# Patient Record
Sex: MALE | Race: WHITE | NOT HISPANIC OR LATINO | Employment: OTHER | URBAN - METROPOLITAN AREA
[De-identification: names, ages, dates, MRNs, and addresses within clinical notes are randomized per-mention and may not be internally consistent; named-entity substitution may affect disease eponyms.]

---

## 2019-10-02 ENCOUNTER — APPOINTMENT (OUTPATIENT)
Dept: LAB | Facility: CLINIC | Age: 24
End: 2019-10-02
Payer: COMMERCIAL

## 2019-10-02 ENCOUNTER — TRANSCRIBE ORDERS (OUTPATIENT)
Dept: LAB | Facility: CLINIC | Age: 24
End: 2019-10-02

## 2019-10-02 DIAGNOSIS — F39 MILD MOOD DISORDER (HCC): Primary | ICD-10-CM

## 2019-10-02 DIAGNOSIS — F39 MILD MOOD DISORDER (HCC): ICD-10-CM

## 2019-10-02 LAB
ALBUMIN SERPL BCP-MCNC: 4.4 G/DL (ref 3.5–5)
ALP SERPL-CCNC: 48 U/L (ref 46–116)
ALT SERPL W P-5'-P-CCNC: 24 U/L (ref 12–78)
ANION GAP SERPL CALCULATED.3IONS-SCNC: 5 MMOL/L (ref 4–13)
AST SERPL W P-5'-P-CCNC: 18 U/L (ref 5–45)
BASOPHILS # BLD AUTO: 0.04 THOUSANDS/ΜL (ref 0–0.1)
BASOPHILS NFR BLD AUTO: 1 % (ref 0–1)
BILIRUB SERPL-MCNC: 0.53 MG/DL (ref 0.2–1)
BUN SERPL-MCNC: 17 MG/DL (ref 5–25)
CALCIUM SERPL-MCNC: 9.3 MG/DL (ref 8.3–10.1)
CHLORIDE SERPL-SCNC: 105 MMOL/L (ref 100–108)
CHOLEST SERPL-MCNC: 101 MG/DL (ref 50–200)
CO2 SERPL-SCNC: 29 MMOL/L (ref 21–32)
CREAT SERPL-MCNC: 0.94 MG/DL (ref 0.6–1.3)
EOSINOPHIL # BLD AUTO: 0.07 THOUSAND/ΜL (ref 0–0.61)
EOSINOPHIL NFR BLD AUTO: 2 % (ref 0–6)
ERYTHROCYTE [DISTWIDTH] IN BLOOD BY AUTOMATED COUNT: 12.7 % (ref 11.6–15.1)
GFR SERPL CREATININE-BSD FRML MDRD: 115 ML/MIN/1.73SQ M
GLUCOSE P FAST SERPL-MCNC: 78 MG/DL (ref 65–99)
HCT VFR BLD AUTO: 47.8 % (ref 36.5–49.3)
HDLC SERPL-MCNC: 36 MG/DL (ref 40–60)
HGB BLD-MCNC: 15.8 G/DL (ref 12–17)
IMM GRANULOCYTES # BLD AUTO: 0.01 THOUSAND/UL (ref 0–0.2)
IMM GRANULOCYTES NFR BLD AUTO: 0 % (ref 0–2)
LDLC SERPL CALC-MCNC: 55 MG/DL (ref 0–100)
LYMPHOCYTES # BLD AUTO: 1.81 THOUSANDS/ΜL (ref 0.6–4.47)
LYMPHOCYTES NFR BLD AUTO: 41 % (ref 14–44)
MCH RBC QN AUTO: 31 PG (ref 26.8–34.3)
MCHC RBC AUTO-ENTMCNC: 33.1 G/DL (ref 31.4–37.4)
MCV RBC AUTO: 94 FL (ref 82–98)
MONOCYTES # BLD AUTO: 0.45 THOUSAND/ΜL (ref 0.17–1.22)
MONOCYTES NFR BLD AUTO: 10 % (ref 4–12)
NEUTROPHILS # BLD AUTO: 2.01 THOUSANDS/ΜL (ref 1.85–7.62)
NEUTS SEG NFR BLD AUTO: 46 % (ref 43–75)
NONHDLC SERPL-MCNC: 65 MG/DL
NRBC BLD AUTO-RTO: 0 /100 WBCS
PLATELET # BLD AUTO: 243 THOUSANDS/UL (ref 149–390)
PMV BLD AUTO: 11.1 FL (ref 8.9–12.7)
POTASSIUM SERPL-SCNC: 4 MMOL/L (ref 3.5–5.3)
PROT SERPL-MCNC: 8.1 G/DL (ref 6.4–8.2)
RBC # BLD AUTO: 5.1 MILLION/UL (ref 3.88–5.62)
SODIUM SERPL-SCNC: 139 MMOL/L (ref 136–145)
TRIGL SERPL-MCNC: 49 MG/DL
VALPROATE SERPL-MCNC: 70 UG/ML (ref 50–100)
WBC # BLD AUTO: 4.39 THOUSAND/UL (ref 4.31–10.16)

## 2019-10-02 PROCEDURE — 85025 COMPLETE CBC W/AUTO DIFF WBC: CPT

## 2019-10-02 PROCEDURE — 36415 COLL VENOUS BLD VENIPUNCTURE: CPT

## 2019-10-02 PROCEDURE — 80164 ASSAY DIPROPYLACETIC ACD TOT: CPT

## 2019-10-02 PROCEDURE — 80053 COMPREHEN METABOLIC PANEL: CPT

## 2019-10-02 PROCEDURE — 80061 LIPID PANEL: CPT

## 2019-11-21 ENCOUNTER — TRANSCRIBE ORDERS (OUTPATIENT)
Dept: LAB | Facility: CLINIC | Age: 24
End: 2019-11-21

## 2019-11-21 DIAGNOSIS — Z13.6 SCREENING FOR CARDIOVASCULAR CONDITION: Primary | ICD-10-CM

## 2019-11-21 DIAGNOSIS — Z00.00 ENCOUNTER FOR ANNUAL HEALTH EXAMINATION: ICD-10-CM

## 2019-12-18 ENCOUNTER — HOSPITAL ENCOUNTER (EMERGENCY)
Facility: HOSPITAL | Age: 24
Discharge: HOME | End: 2019-12-18
Attending: EMERGENCY MEDICINE
Payer: COMMERCIAL

## 2019-12-18 ENCOUNTER — APPOINTMENT (EMERGENCY)
Dept: RADIOLOGY | Facility: HOSPITAL | Age: 24
End: 2019-12-18
Payer: COMMERCIAL

## 2019-12-18 VITALS
SYSTOLIC BLOOD PRESSURE: 158 MMHG | DIASTOLIC BLOOD PRESSURE: 66 MMHG | OXYGEN SATURATION: 98 % | HEART RATE: 95 BPM | WEIGHT: 167.33 LBS | TEMPERATURE: 98.9 F | RESPIRATION RATE: 20 BRPM

## 2019-12-18 DIAGNOSIS — R09.89 CHOKING EPISODE: Primary | ICD-10-CM

## 2019-12-18 PROCEDURE — 99283 EMERGENCY DEPT VISIT LOW MDM: CPT

## 2019-12-18 PROCEDURE — 71046 X-RAY EXAM CHEST 2 VIEWS: CPT

## 2019-12-18 PROCEDURE — 99282 EMERGENCY DEPT VISIT SF MDM: CPT | Performed by: PHYSICIAN ASSISTANT

## 2019-12-18 NOTE — ED PROVIDER NOTES
History  Chief Complaint   Patient presents with    Aspiration     Caregiver reports patient was choking on spahgetti prior to ems arrival, patient was able to clear food prior to ems arrival  Sent to ED for evaluation  Natalia Keating is a 26 yo M, w/ PMH of autism, bipolar, dysphagia, presenting by EMS for evaluation of "choking" episode occurring just prior to arrival  Per accompanying assisted living staff, pt was eating spaghetti when he appeared to "choke" for several seconds, followed by coughing  Per staff the pt coughed up the food bolus and had returned to baseline prior to EMS evaluation  Since episode pt has been behaving per baseline, no further coughing  No SOB, wheezing, or stridor appreciated by EMS/staff  Pt on soft mechanical diet at baseline  History provided by:  Patient   used: No        None       Past Medical History:   Diagnosis Date    Autism     [facility]     Dysphagia     Explosive personality disorder ([facility]     Insomnia        History reviewed  No pertinent surgical history  History reviewed  No pertinent family history  I have reviewed and agree with the history as documented  Social History     Tobacco Use    Smoking status: Never Smoker    Smokeless tobacco: Never Used   Substance Use Topics    Alcohol use: Never     Frequency: Never    Drug use: Not on file        Review of Systems   Constitutional: Negative for chills and fever  HENT: Negative for congestion, drooling, rhinorrhea, sore throat and voice change  Eyes: Negative for pain and visual disturbance  Respiratory: Positive for choking (resolved)  Negative for cough, shortness of breath, wheezing and stridor  Cardiovascular: Negative for chest pain and palpitations  Gastrointestinal: Negative for abdominal pain, nausea and vomiting  Genitourinary: Negative for dysuria, frequency and urgency  Musculoskeletal: Negative for back pain, neck pain and neck stiffness  Skin: Negative for rash and wound  Neurological: Negative for dizziness, weakness, light-headedness and numbness  Physical Exam  Physical Exam   Constitutional: He is oriented to person, place, and time  He appears well-developed and well-nourished  No distress  HENT:   Head: Normocephalic and atraumatic  Right Ear: External ear normal    Left Ear: External ear normal    Mouth/Throat: Oropharynx is clear and moist    Eyes: Pupils are equal, round, and reactive to light  Conjunctivae and EOM are normal    Neck: Normal range of motion  Neck supple  Cardiovascular: Normal rate, regular rhythm, normal heart sounds and intact distal pulses  Exam reveals no gallop and no friction rub  No murmur heard  Pulmonary/Chest: Effort normal and breath sounds normal  No stridor  No respiratory distress  He has no wheezes  No acute respiratory distress of increased work of breathing  Lungs CTAB  No stridor or wheezing  Abdominal: Soft  He exhibits no distension  There is no tenderness  Lymphadenopathy:     He has no cervical adenopathy  Neurological: He is alert and oriented to person, place, and time  He exhibits normal muscle tone  Coordination normal    Skin: Skin is warm and dry  Capillary refill takes less than 2 seconds  No rash noted  No erythema  Psychiatric: He has a normal mood and affect   His behavior is normal  Judgment and thought content normal        Vital Signs  ED Triage Vitals [12/18/19 1158]   Temperature Pulse Respirations Blood Pressure SpO2   98 9 °F (37 2 °C) 95 20 158/66 98 %      Temp Source Heart Rate Source Patient Position - Orthostatic VS BP Location FiO2 (%)   Temporal Monitor Sitting Right arm --      Pain Score       No Pain           Vitals:    12/18/19 1158   BP: 158/66   Pulse: 95   Patient Position - Orthostatic VS: Sitting         Visual Acuity      ED Medications  Medications - No data to display    Diagnostic Studies  Results Reviewed     None                 XR chest 2 views   Final Result by Jodie Workman MD (12/18 4789)      No acute cardiopulmonary disease  Workstation performed: [id number]                    Procedures  Procedures         ED Course                               MDM  Number of Diagnoses or Management Options  Choking episode:   Diagnosis management comments: Resolved choking episode while eating at facility prior to EMS arrival  No further episode during transport/ED eval  CXR is unremarkable  Lungs are CTAB  No respiratory distress, stridor, or wheezing  Will discharge for follow up with PCP for discussion of diet given several prior episodes of similar  Patient Progress  Patient progress: resolved        Disposition  Final diagnoses:   Choking episode     Time reflects when diagnosis was documented in both MDM as applicable and the Disposition within this note     Time User Action Codes Description Comment    12/18/2019  1:16 PM Tanya Garcia [R09 89] Choking episode       ED Disposition     ED Disposition Condition Date/Time Comment    Discharge Stable Wed Dec 18, 2019  1:16 PM Inna Thapa discharge to home/self care  Follow-up Information     Follow up With Specialties Details Why Contact Info    Primary care physician  Schedule an appointment as soon as possible for a visit             There are no discharge medications for this patient  No discharge procedures on file      ED Provider  Electronically Signed by           Georgi Ramírez PA-C  12/20/19 2015

## 2019-12-18 NOTE — ED NOTES
Patient transported to [facility] , RN  12/18/19 3377

## 2020-10-05 ENCOUNTER — OFFICE VISIT (OUTPATIENT)
Dept: URGENT CARE | Facility: MEDICAL CENTER | Age: 25
End: 2020-10-05
Payer: COMMERCIAL

## 2020-10-05 VITALS
RESPIRATION RATE: 16 BRPM | TEMPERATURE: 97.8 F | HEIGHT: 67 IN | HEART RATE: 84 BPM | WEIGHT: 125 LBS | OXYGEN SATURATION: 97 % | BODY MASS INDEX: 19.62 KG/M2

## 2020-10-05 DIAGNOSIS — Z20.822 CLOSE EXPOSURE TO COVID-19 VIRUS: Primary | ICD-10-CM

## 2020-10-05 PROCEDURE — 99203 OFFICE O/P NEW LOW 30 MIN: CPT | Performed by: PHYSICIAN ASSISTANT

## 2020-10-05 PROCEDURE — G0382 LEV 3 HOSP TYPE B ED VISIT: HCPCS | Performed by: PHYSICIAN ASSISTANT

## 2020-10-05 PROCEDURE — 99283 EMERGENCY DEPT VISIT LOW MDM: CPT | Performed by: PHYSICIAN ASSISTANT

## 2020-10-05 PROCEDURE — U0003 INFECTIOUS AGENT DETECTION BY NUCLEIC ACID (DNA OR RNA); SEVERE ACUTE RESPIRATORY SYNDROME CORONAVIRUS 2 (SARS-COV-2) (CORONAVIRUS DISEASE [COVID-19]), AMPLIFIED PROBE TECHNIQUE, MAKING USE OF HIGH THROUGHPUT TECHNOLOGIES AS DESCRIBED BY CMS-2020-01-R: HCPCS | Performed by: PHYSICIAN ASSISTANT

## 2020-10-05 RX ORDER — RISPERIDONE 2 MG/1
2 TABLET, FILM COATED ORAL DAILY
COMMUNITY

## 2020-10-05 RX ORDER — ASPIRIN 81 MG
TABLET, DELAYED RELEASE (ENTERIC COATED) ORAL
COMMUNITY
Start: 2020-06-15

## 2020-10-05 RX ORDER — IBUPROFEN 600 MG/1
600 TABLET ORAL EVERY 6 HOURS PRN
COMMUNITY
Start: 2020-03-05 | End: 2021-03-05

## 2020-10-05 RX ORDER — LORAZEPAM 1 MG/1
1 TABLET ORAL EVERY 8 HOURS PRN
COMMUNITY

## 2020-10-05 RX ORDER — LAMOTRIGINE 25 MG/1
25 TABLET ORAL 2 TIMES DAILY
COMMUNITY

## 2020-10-05 RX ORDER — RISPERIDONE 1 MG/1
1 TABLET, FILM COATED ORAL DAILY
COMMUNITY

## 2020-10-06 LAB — SARS-COV-2 RNA SPEC QL NAA+PROBE: NOT DETECTED

## 2020-10-13 ENCOUNTER — TELEPHONE (OUTPATIENT)
Dept: URGENT CARE | Facility: MEDICAL CENTER | Age: 25
End: 2020-10-13

## 2020-10-13 ENCOUNTER — TELEPHONE (OUTPATIENT)
Dept: URGENT CARE | Facility: CLINIC | Age: 25
End: 2020-10-13

## 2020-11-09 ENCOUNTER — TRANSCRIBE ORDERS (OUTPATIENT)
Dept: ADMINISTRATIVE | Facility: HOSPITAL | Age: 25
End: 2020-11-09

## 2020-11-09 ENCOUNTER — LAB (OUTPATIENT)
Dept: LAB | Facility: MEDICAL CENTER | Age: 25
End: 2020-11-09
Payer: COMMERCIAL

## 2020-11-09 DIAGNOSIS — D72.819 LEUKOPENIA, UNSPECIFIED TYPE: ICD-10-CM

## 2020-11-09 DIAGNOSIS — R23.9 SKIN CHANGE: ICD-10-CM

## 2020-11-09 DIAGNOSIS — R23.9 SKIN CHANGE: Primary | ICD-10-CM

## 2020-11-09 LAB
BASOPHILS # BLD AUTO: 0.04 THOUSANDS/ΜL (ref 0–0.1)
BASOPHILS NFR BLD AUTO: 1 % (ref 0–1)
EOSINOPHIL # BLD AUTO: 0.07 THOUSAND/ΜL (ref 0–0.61)
EOSINOPHIL NFR BLD AUTO: 2 % (ref 0–6)
ERYTHROCYTE [DISTWIDTH] IN BLOOD BY AUTOMATED COUNT: 13.2 % (ref 11.6–15.1)
HCT VFR BLD AUTO: 47.7 % (ref 36.5–49.3)
HGB BLD-MCNC: 15.5 G/DL (ref 12–17)
IMM GRANULOCYTES # BLD AUTO: 0.01 THOUSAND/UL (ref 0–0.2)
IMM GRANULOCYTES NFR BLD AUTO: 0 % (ref 0–2)
LYMPHOCYTES # BLD AUTO: 1.74 THOUSANDS/ΜL (ref 0.6–4.47)
LYMPHOCYTES NFR BLD AUTO: 41 % (ref 14–44)
MCH RBC QN AUTO: 30.6 PG (ref 26.8–34.3)
MCHC RBC AUTO-ENTMCNC: 32.5 G/DL (ref 31.4–37.4)
MCV RBC AUTO: 94 FL (ref 82–98)
MONOCYTES # BLD AUTO: 0.5 THOUSAND/ΜL (ref 0.17–1.22)
MONOCYTES NFR BLD AUTO: 12 % (ref 4–12)
NEUTROPHILS # BLD AUTO: 1.86 THOUSANDS/ΜL (ref 1.85–7.62)
NEUTS SEG NFR BLD AUTO: 44 % (ref 43–75)
NRBC BLD AUTO-RTO: 0 /100 WBCS
PLATELET # BLD AUTO: 213 THOUSANDS/UL (ref 149–390)
PMV BLD AUTO: 11.2 FL (ref 8.9–12.7)
RBC # BLD AUTO: 5.07 MILLION/UL (ref 3.88–5.62)
WBC # BLD AUTO: 4.22 THOUSAND/UL (ref 4.31–10.16)

## 2020-11-09 PROCEDURE — 85025 COMPLETE CBC W/AUTO DIFF WBC: CPT

## 2020-11-09 PROCEDURE — 36415 COLL VENOUS BLD VENIPUNCTURE: CPT

## 2020-12-06 ENCOUNTER — OFFICE VISIT (OUTPATIENT)
Dept: URGENT CARE | Facility: MEDICAL CENTER | Age: 25
End: 2020-12-06
Payer: COMMERCIAL

## 2020-12-06 VITALS
HEART RATE: 100 BPM | OXYGEN SATURATION: 98 % | TEMPERATURE: 99.8 F | RESPIRATION RATE: 20 BRPM | BODY MASS INDEX: 19.62 KG/M2 | WEIGHT: 125 LBS | HEIGHT: 67 IN

## 2020-12-06 DIAGNOSIS — Z11.59 SPECIAL SCREENING EXAMINATION FOR UNSPECIFIED VIRAL DISEASE: Primary | ICD-10-CM

## 2020-12-06 PROCEDURE — G0463 HOSPITAL OUTPT CLINIC VISIT: HCPCS | Performed by: PHYSICIAN ASSISTANT

## 2020-12-06 PROCEDURE — 99213 OFFICE O/P EST LOW 20 MIN: CPT | Performed by: PHYSICIAN ASSISTANT

## 2020-12-06 PROCEDURE — 87637 SARSCOV2&INF A&B&RSV AMP PRB: CPT | Performed by: PHYSICIAN ASSISTANT

## 2020-12-06 RX ORDER — TRAZODONE HYDROCHLORIDE 50 MG/1
50 TABLET ORAL
COMMUNITY

## 2020-12-06 RX ORDER — LANOLIN ALCOHOL/MO/W.PET/CERES
6 CREAM (GRAM) TOPICAL
COMMUNITY

## 2020-12-10 LAB
FLUAV RNA NPH QL NAA+PROBE: NOT DETECTED
FLUBV RNA NPH QL NAA+PROBE: NOT DETECTED
RSV RNA NPH QL NAA+PROBE: NOT DETECTED
SARS-COV-2 RNA NPH QL NAA+PROBE: DETECTED

## 2020-12-11 ENCOUNTER — TELEPHONE (OUTPATIENT)
Dept: URGENT CARE | Facility: MEDICAL CENTER | Age: 25
End: 2020-12-11

## 2020-12-12 ENCOUNTER — APPOINTMENT (EMERGENCY)
Dept: RADIOLOGY | Facility: HOSPITAL | Age: 25
End: 2020-12-12
Payer: COMMERCIAL

## 2020-12-12 ENCOUNTER — HOSPITAL ENCOUNTER (EMERGENCY)
Facility: HOSPITAL | Age: 25
Discharge: HOME | End: 2020-12-12
Attending: EMERGENCY MEDICINE
Payer: COMMERCIAL

## 2020-12-12 VITALS
DIASTOLIC BLOOD PRESSURE: 75 MMHG | WEIGHT: 169.09 LBS | SYSTOLIC BLOOD PRESSURE: 168 MMHG | OXYGEN SATURATION: 99 % | TEMPERATURE: 97.4 F | HEART RATE: 84 BPM | RESPIRATION RATE: 19 BRPM | BODY MASS INDEX: 26.48 KG/M2

## 2020-12-12 DIAGNOSIS — R11.0 NAUSEA: ICD-10-CM

## 2020-12-12 DIAGNOSIS — U07.1 COVID-19: Primary | ICD-10-CM

## 2020-12-12 PROCEDURE — 99284 EMERGENCY DEPT VISIT MOD MDM: CPT | Performed by: EMERGENCY MEDICINE

## 2020-12-12 PROCEDURE — 99283 EMERGENCY DEPT VISIT LOW MDM: CPT

## 2020-12-12 PROCEDURE — 71045 X-RAY EXAM CHEST 1 VIEW: CPT

## 2020-12-12 RX ORDER — ONDANSETRON 4 MG/1
4 TABLET, ORALLY DISINTEGRATING ORAL EVERY 8 HOURS PRN
Qty: 10 TABLET | Refills: 0 | Status: SHIPPED | OUTPATIENT
Start: 2020-12-12 | End: 2020-12-19

## 2020-12-12 RX ORDER — ONDANSETRON 4 MG/1
4 TABLET, ORALLY DISINTEGRATING ORAL ONCE
Status: COMPLETED | OUTPATIENT
Start: 2020-12-12 | End: 2020-12-12

## 2020-12-12 RX ADMIN — ONDANSETRON 4 MG: 4 TABLET, ORALLY DISINTEGRATING ORAL at 12:16

## 2021-09-10 ENCOUNTER — HOSPITAL ENCOUNTER (EMERGENCY)
Facility: HOSPITAL | Age: 26
Discharge: HOME | End: 2021-09-10
Attending: EMERGENCY MEDICINE
Payer: COMMERCIAL

## 2021-09-10 VITALS
RESPIRATION RATE: 18 BRPM | TEMPERATURE: 97.9 F | SYSTOLIC BLOOD PRESSURE: 140 MMHG | HEIGHT: 67 IN | HEART RATE: 111 BPM | OXYGEN SATURATION: 97 % | DIASTOLIC BLOOD PRESSURE: 108 MMHG | BODY MASS INDEX: 26.68 KG/M2 | WEIGHT: 170 LBS

## 2021-09-10 DIAGNOSIS — S00.81XA FOREHEAD ABRASION, INITIAL ENCOUNTER: Primary | ICD-10-CM

## 2021-09-10 DIAGNOSIS — Z72.89 SELF-INJURIOUS BEHAVIOR: ICD-10-CM

## 2021-09-10 PROCEDURE — 99284 EMERGENCY DEPT VISIT MOD MDM: CPT | Performed by: EMERGENCY MEDICINE

## 2021-09-10 PROCEDURE — 99283 EMERGENCY DEPT VISIT LOW MDM: CPT

## 2021-09-10 RX ORDER — BACITRACIN, NEOMYCIN, POLYMYXIN B 400; 3.5; 5 [USP'U]/G; MG/G; [USP'U]/G
1 OINTMENT TOPICAL ONCE
Status: COMPLETED | OUTPATIENT
Start: 2021-09-10 | End: 2021-09-10

## 2021-09-10 RX ORDER — BACITRACIN, NEOMYCIN, POLYMYXIN B 400; 3.5; 5 [USP'U]/G; MG/G; [USP'U]/G
OINTMENT TOPICAL 3 TIMES DAILY
Qty: 15 G | Refills: 0 | Status: SHIPPED | OUTPATIENT
Start: 2021-09-10

## 2021-09-10 RX ADMIN — BACITRACIN, NEOMYCIN, POLYMYXIN B 1 SMALL APPLICATION: 400; 3.5; 5 OINTMENT TOPICAL at 13:25

## 2021-09-10 NOTE — ED PROVIDER NOTES
History  Chief Complaint   Patient presents with    Head Injury     superficial wound noted to forehead  staff believe this is from self-injurous behavior  Patient is a 23year old male with a past medical history significant for autism, bipolar disorder, non-verbal at baseline, residing at group home, brought in by staff because they noticed an abrasion to the center of his forehead  Staff report that patient has a tic and hits/swipes himself in the forehead frequently and they noticed an abrasion yesterday  Deny any trauma/ falls  Report that patient is acting at his baseline mentation  Tetanus is UTD  Prior to Admission Medications   Prescriptions Last Dose Informant Patient Reported? Taking?    Divalproex Sodium (DEPAKOTE PO)   Yes No   Sig: Take 125 mg by mouth 4 (four) times a day    FLUOXETINE HCL PO   Yes No   Sig: Take by mouth   LORazepam (ATIVAN) 1 mg tablet   Yes No   Sig: Take 1 mg by mouth every 8 (eight) hours as needed for anxiety   Loratadine (CLARITIN PO)   Yes No   Sig: Take by mouth   Starch, Thickening, POWD   Yes No   Sig: USE AS DIRECTED FOR NECTAR THICK CONSISTENCY (R13 12)   carbamide peroxide (Debrox) 6 5 % otic solution   Yes No   Sig: INSTILL 5 DROPS INTO BOTH EARS DAILY ON THE FIRST 3 DAYS OF EACH MONTH (EAR WAX)   guanFACINE HCl (TENEX PO)   Yes No   Sig: Take by mouth   ibuprofen (MOTRIN) 600 mg tablet   Yes No   Sig: Take 600 mg by mouth every 6 (six) hours as needed   lamoTRIgine (LaMICtal) 25 mg tablet   Yes No   Sig: Take 25 mg by mouth 2 (two) times a day   melatonin 3 mg   Yes No   Sig: Take 6 mg by mouth daily at bedtime   ondansetron (ZOFRAN-ODT) 4 mg disintegrating tablet   No No   Sig: Take 1 tablet (4 mg total) by mouth every 8 (eight) hours as needed for nausea or vomiting for up to 7 days   risperiDONE (RisperDAL) 1 mg tablet   Yes No   Sig: Take 1 mg by mouth daily    risperiDONE (RisperDAL) 2 mg tablet   Yes No   Sig: Take 2 mg by mouth daily At night time   traZODone (DESYREL) 50 mg tablet   Yes No   Sig: Take 50 mg by mouth daily at bedtime      Facility-Administered Medications: None       Past Medical History:   Diagnosis Date    Autism     Bipolar disorder ([facility] )     Dysphagia     Explosive personality disorder ([facility] )     Insomnia        History reviewed  No pertinent surgical history  History reviewed  No pertinent family history  I have reviewed and agree with the history as documented  E-Cigarette/Vaping    E-Cigarette Use Never User      E-Cigarette/Vaping Substances     Social History     Tobacco Use    Smoking status: Never Smoker    Smokeless tobacco: Never Used   Vaping Use    Vaping Use: Never used   Substance Use Topics    Alcohol use: Never    Drug use: Never       Review of Systems   Unable to perform ROS: Patient nonverbal       Physical Exam  Physical Exam  Vitals and nursing note reviewed  Constitutional:       General: He is not in acute distress  Appearance: Normal appearance  He is not ill-appearing, toxic-appearing or diaphoretic  HENT:      Head: Normocephalic  Mouth/Throat:      Mouth: Mucous membranes are moist    Eyes:      Extraocular Movements: Extraocular movements intact  Conjunctiva/sclera: Conjunctivae normal       Pupils: Pupils are equal, round, and reactive to light  Cardiovascular:      Rate and Rhythm: Regular rhythm  Tachycardia present  Pulses: Normal pulses  Heart sounds: Normal heart sounds  No murmur heard  Pulmonary:      Effort: Pulmonary effort is normal  No respiratory distress  Breath sounds: Normal breath sounds  No stridor  No wheezing, rhonchi or rales  Chest:      Chest wall: No tenderness  Abdominal:      General: Bowel sounds are normal  There is no distension  Palpations: Abdomen is soft  Tenderness: There is no abdominal tenderness  There is no guarding or rebound     Musculoskeletal:      Cervical back: Full passive range of motion without pain, normal range of motion and neck supple  No edema, erythema, signs of trauma, rigidity, torticollis or crepitus  No pain with movement, spinous process tenderness or muscular tenderness  Normal range of motion  Right lower leg: No edema  Left lower leg: No edema  Lymphadenopathy:      Cervical: No cervical adenopathy  Skin:     General: Skin is warm and dry  Neurological:      General: No focal deficit present  Mental Status: He is alert  Mental status is at baseline  Psychiatric:         Mood and Affect: Mood is anxious  Vital Signs  ED Triage Vitals [09/10/21 1318]   Temperature Pulse Respirations Blood Pressure SpO2   97 9 °F (36 6 °C) (!) 111 18 (!) 140/108 97 %      Temp Source Heart Rate Source Patient Position - Orthostatic VS BP Location FiO2 (%)   Temporal Monitor -- -- --      Pain Score       --           Vitals:    09/10/21 1318   BP: (!) 140/108   Pulse: (!) 111         Visual Acuity  Visual Acuity      Most Recent Value   L Pupil Size (mm)  3   R Pupil Size (mm)  3          ED Medications  Medications   neomycin-bacitracin-polymyxin b (NEOSPORIN) ointment 1 small application (1 small application Topical Given 9/10/21 1325)       Diagnostic Studies  Results Reviewed     None                 No orders to display              Procedures  Procedures         ED Course                                           MDM  Number of Diagnoses or Management Options  Forehead abrasion, initial encounter  Self-injurious behavior  Diagnosis management comments: Assessment and Plan:   23year old M presenting with superficial abrasion to the center of the forehead appears to be likely from constant hitting/ scratching, which I visualized during my examination  Patient is very anxious, but was able to cooperate with examination   He is non-verbal, but does follow basic commands, is non-focal  Will need to have neosporin applied to wound and have it covered to attempt to prevent continued injury to the skin  Prescribed neosporin  Discussed return precautions and discharge instructions with the 2 staff members present  Disposition  Final diagnoses:   Forehead abrasion, initial encounter   Self-injurious behavior     Time reflects when diagnosis was documented in both MDM as applicable and the Disposition within this note     Time User Action Codes Description Comment    9/10/2021  1:24 PM Maria T Cabrera Forehead abrasion, initial encounter     9/10/2021  1:24 PM [place] Back Add [Z72 89] Self-injurious behavior       ED Disposition     ED Disposition Condition Date/Time Comment    Discharge Stable Fri Sep 10, 2021  1:24 PM [place] discharge to home/self care              Follow-up Information     Follow up With Specialties Details Why Contact Info Maicol Peña [address] Emergency Department Emergency Medicine Go to  As needed, If symptoms worsen, for re-evaluation [address] [place], [id number]  [address] [facility] Emergency Department, [address] [facility], [facility], [facility] [address] [address]    Your primary care doctor  Schedule an appointment as soon as possible for a visit in 3 days For wound re-check            Discharge Medication List as of 9/10/2021  1:26 PM      START taking these medications    Details   neomycin-bacitracin-polymyxin b (NEOSPORIN) ointment Apply topically 3 (three) times a day, Starting Fri 9/10/2021, Normal         CONTINUE these medications which have NOT CHANGED    Details   carbamide peroxide (Debrox) 6 5 % otic solution INSTILL 5 DROPS INTO BOTH EARS DAILY ON THE FIRST 3 DAYS OF EACH MONTH (EAR WAX), Historical Med      Divalproex Sodium (DEPAKOTE PO) Take 125 mg by mouth 4 (four) times a day , Historical Med      FLUOXETINE HCL PO Take by mouth, Historical Med      guanFACINE HCl (TENEX PO) Take by mouth, Historical Med      ibuprofen (MOTRIN) 600 mg tablet Take 600 mg by mouth every 6 (six) hours as needed, Starting Thu 3/5/2020, Until Fri 3/5/2021, Historical Med      lamoTRIgine (LaMICtal) 25 mg tablet Take 25 mg by mouth 2 (two) times a day, Historical Med      Loratadine (CLARITIN PO) Take by mouth, Historical Med      LORazepam (ATIVAN) 1 mg tablet Take 1 mg by mouth every 8 (eight) hours as needed for anxiety, Historical Med      melatonin 3 mg Take 6 mg by mouth daily at bedtime, Historical Med      ondansetron (ZOFRAN-ODT) 4 mg disintegrating tablet Take 1 tablet (4 mg total) by mouth every 8 (eight) hours as needed for nausea or vomiting for up to 7 days, Starting Sat 12/12/2020, Until Sat 12/19/2020, Normal      !! risperiDONE (RisperDAL) 1 mg tablet Take 1 mg by mouth daily , Historical Med      !! risperiDONE (RisperDAL) 2 mg tablet Take 2 mg by mouth daily At night time, Historical Med      Starch, Thickening, POWD USE AS DIRECTED FOR NECTAR THICK CONSISTENCY (R13 12), Historical Med      traZODone (DESYREL) 50 mg tablet Take 50 mg by mouth daily at bedtime, Historical Med       !! - Potential duplicate medications found  Please discuss with provider  No discharge procedures on file      PDMP Review     None          ED Provider  Electronically Signed by           Anna Romeo, DO  09/10/21 [address] [place] [address], DO  09/10/21 0436

## 2021-09-10 NOTE — ED NOTES
Pt remains in waiting area with staff, clutching large spoon, and staff will attempt to calm pt prior to entering the ER  Pts caregivers state pt is not on blood thinners  Denies LOC       Onel Nicole RN  09/10/21 0982

## 2021-09-10 NOTE — DISCHARGE INSTRUCTIONS
Please apply Neosporin 3 times daily to the forehead abrasion  Follow-up with primary care doctor in 2-3 days for wound recheck  Return to the emergency department for the following, but not limited to redness spreading around the site, pus draining from the wound, fevers, change in mental status

## 2021-09-10 NOTE — ED NOTES
Attempting to coax patient into wheelchair  Patient hesitant and yelling  Caregivers encouraging patient        Karl Hamilton RN  09/10/21 8015

## 2021-09-10 NOTE — ED NOTES
Circular superficial abrasion noted to forehead   Cleansed and neosporin applied     Yumi Caldwell RN  09/10/21 9564

## 2021-10-07 ENCOUNTER — APPOINTMENT (OUTPATIENT)
Dept: LAB | Facility: HOSPITAL | Age: 26
End: 2021-10-07
Payer: COMMERCIAL

## 2021-10-07 DIAGNOSIS — Z00.00 ENCOUNTER FOR ANNUAL PHYSICAL EXAM: ICD-10-CM

## 2021-10-07 DIAGNOSIS — Z13.220 SCREENING FOR LIPID DISORDERS: ICD-10-CM

## 2021-10-07 LAB
ALBUMIN SERPL BCP-MCNC: 3.9 G/DL (ref 3.5–5)
ALP SERPL-CCNC: 52 U/L (ref 46–116)
ALT SERPL W P-5'-P-CCNC: 23 U/L (ref 12–78)
ANION GAP SERPL CALCULATED.3IONS-SCNC: 11 MMOL/L (ref 4–13)
AST SERPL W P-5'-P-CCNC: 14 U/L (ref 5–45)
BILIRUB SERPL-MCNC: 0.5 MG/DL (ref 0.2–1)
BUN SERPL-MCNC: 16 MG/DL (ref 5–25)
CALCIUM SERPL-MCNC: 8.6 MG/DL (ref 8.3–10.1)
CHLORIDE SERPL-SCNC: 104 MMOL/L (ref 100–108)
CHOLEST SERPL-MCNC: 110 MG/DL (ref 50–200)
CO2 SERPL-SCNC: 26 MMOL/L (ref 21–32)
CREAT SERPL-MCNC: 0.82 MG/DL (ref 0.6–1.3)
GFR SERPL CREATININE-BSD FRML MDRD: 125 ML/MIN/1.73SQ M
GLUCOSE P FAST SERPL-MCNC: 94 MG/DL (ref 65–99)
HDLC SERPL-MCNC: 46 MG/DL
LDLC SERPL CALC-MCNC: 55 MG/DL (ref 0–100)
NONHDLC SERPL-MCNC: 64 MG/DL
POTASSIUM SERPL-SCNC: 3.8 MMOL/L (ref 3.5–5.3)
PROT SERPL-MCNC: 7.5 G/DL (ref 6.4–8.2)
SODIUM SERPL-SCNC: 141 MMOL/L (ref 136–145)
TRIGL SERPL-MCNC: 43 MG/DL
TSH SERPL DL<=0.05 MIU/L-ACNC: 1.71 UIU/ML (ref 0.36–3.74)

## 2021-10-07 PROCEDURE — 80053 COMPREHEN METABOLIC PANEL: CPT

## 2021-10-07 PROCEDURE — 84443 ASSAY THYROID STIM HORMONE: CPT

## 2021-10-07 PROCEDURE — 80061 LIPID PANEL: CPT

## 2021-10-07 PROCEDURE — 36415 COLL VENOUS BLD VENIPUNCTURE: CPT

## 2021-12-04 ENCOUNTER — APPOINTMENT (OUTPATIENT)
Dept: LAB | Facility: HOSPITAL | Age: 26
End: 2021-12-04
Payer: COMMERCIAL

## 2021-12-04 DIAGNOSIS — Z79.899 ENCOUNTER FOR LONG-TERM (CURRENT) USE OF OTHER MEDICATIONS: ICD-10-CM

## 2021-12-04 LAB — VALPROATE SERPL-MCNC: 62 UG/ML (ref 50–100)

## 2021-12-04 PROCEDURE — 36415 COLL VENOUS BLD VENIPUNCTURE: CPT

## 2021-12-04 PROCEDURE — 80164 ASSAY DIPROPYLACETIC ACD TOT: CPT

## 2022-05-26 ENCOUNTER — HOSPITAL ENCOUNTER (EMERGENCY)
Facility: HOSPITAL | Age: 27
Discharge: HOME | End: 2022-05-26
Attending: EMERGENCY MEDICINE
Payer: COMMERCIAL

## 2022-05-26 VITALS
TEMPERATURE: 98.2 F | DIASTOLIC BLOOD PRESSURE: 94 MMHG | WEIGHT: 170 LBS | HEIGHT: 67 IN | BODY MASS INDEX: 26.68 KG/M2 | RESPIRATION RATE: 18 BRPM | OXYGEN SATURATION: 100 % | SYSTOLIC BLOOD PRESSURE: 142 MMHG | HEART RATE: 90 BPM

## 2022-05-26 DIAGNOSIS — Z71.1 PERSON WITH FEARED COMPLAINT, NO DIAGNOSIS MADE: Primary | ICD-10-CM

## 2022-05-26 PROCEDURE — 99281 EMR DPT VST MAYX REQ PHY/QHP: CPT | Performed by: EMERGENCY MEDICINE

## 2022-05-26 PROCEDURE — 99283 EMERGENCY DEPT VISIT LOW MDM: CPT

## 2022-05-26 NOTE — ED NOTES
Pt will not voluntarily ambulate into ER  Staff member not helping        Kermit Adame, MECHELLE  05/26/22 1064

## 2022-05-26 NOTE — ED NOTES
Pt continues to refuse to enter the ER, staff member continues to stand there and watch pt prance around waiting area screaming  Third party person on phone attempting to speak to pt and it now asking staff member if we are "refusing pt care" staff informed that pt is refusing to enter the ER and that no one is refusing care whatsoever         Margareth Carnes RN  05/26/22 2768

## 2022-05-26 NOTE — ED NOTES
attempted to escort pt to ED room #12, pt screaming in waiting area with caregiver and hiding behind chairs         Mayra Kuhn RN  05/26/22 0772

## 2022-05-26 NOTE — ED NOTES
Staff member reports that they are waiting for another staff member to come to force pt into treatment area        Jeffy Monaco RN  05/26/22 2169

## 2022-05-26 NOTE — DISCHARGE INSTRUCTIONS
There is no evidence of aspiration pneumonia or aspiration pneumonitis from the reported episode of vomiting 3 weeks ago    Patient may resume normal activities

## 2022-05-26 NOTE — ED PROVIDER NOTES
History  Chief Complaint   Patient presents with    Medical Problem     Pt is from residential home, staff reports that pt vomited 3 weeks ago and they just found out and that they have a protocol that he must be evaluated for aspiration  Denies n/v/d since then  30-year-old nonverbal male presents from residential for evaluation a vomiting episode that occurred 3 weeks ago per staff  The patient is being brought to the emergency department for evaluation per protocol  There has been no subsequent episodes of nausea vomiting or diarrhea  The staff does not report any respiratory difficulty or fevers  History provided by:  Caregiver  History limited by:  Patient nonverbal  Medical Problem  Associated symptoms: vomiting ( Once 3 weeks ago)    Associated symptoms: no cough and no shortness of breath        Prior to Admission Medications   Prescriptions Last Dose Informant Patient Reported? Taking?    Divalproex Sodium (DEPAKOTE PO)   Yes No   Sig: Take 125 mg by mouth 4 (four) times a day    FLUOXETINE HCL PO   Yes No   Sig: Take by mouth   LORazepam (ATIVAN) 1 mg tablet   Yes No   Sig: Take 1 mg by mouth every 8 (eight) hours as needed for anxiety   Loratadine (CLARITIN PO)   Yes No   Sig: Take by mouth   Starch, Thickening, POWD   Yes No   Sig: USE AS DIRECTED FOR NECTAR THICK CONSISTENCY (R13 12)   carbamide peroxide (Debrox) 6 5 % otic solution   Yes No   Sig: INSTILL 5 DROPS INTO BOTH EARS DAILY ON THE FIRST 3 DAYS OF EACH MONTH (EAR WAX)   guanFACINE HCl (TENEX PO)   Yes No   Sig: Take by mouth   ibuprofen (MOTRIN) 600 mg tablet   Yes No   Sig: Take 600 mg by mouth every 6 (six) hours as needed   lamoTRIgine (LaMICtal) 25 mg tablet   Yes No   Sig: Take 25 mg by mouth 2 (two) times a day   melatonin 3 mg   Yes No   Sig: Take 6 mg by mouth daily at bedtime   neomycin-bacitracin-polymyxin b (NEOSPORIN) ointment   No No   Sig: Apply topically 3 (three) times a day   ondansetron (ZOFRAN-ODT) 4 mg disintegrating tablet   No No   Sig: Take 1 tablet (4 mg total) by mouth every 8 (eight) hours as needed for nausea or vomiting for up to 7 days   risperiDONE (RisperDAL) 1 mg tablet   Yes No   Sig: Take 1 mg by mouth daily    risperiDONE (RisperDAL) 2 mg tablet   Yes No   Sig: Take 2 mg by mouth daily At night time   traZODone (DESYREL) 50 mg tablet   Yes No   Sig: Take 50 mg by mouth daily at bedtime      Facility-Administered Medications: None       Past Medical History:   Diagnosis Date    Autism     [facility]     Dysphagia     [facility]ive [facility] ([facility] )     Insomnia        History reviewed  No pertinent surgical history  History reviewed  No pertinent family history  I have reviewed and agree with the history as documented  E-Cigarette/Vaping    E-Cigarette Use Never User      E-Cigarette/Vaping Substances     Social History     Tobacco Use    Smoking status: Never Smoker    Smokeless tobacco: Never Used   Vaping Use    Vaping Use: Never used   Substance Use Topics    Alcohol use: Never    Drug use: Never       Review of Systems   Unable to perform ROS: Patient nonverbal   Respiratory: Negative for cough and shortness of breath  Gastrointestinal: Positive for vomiting ( Once 3 weeks ago)  All other systems reviewed and are negative  Physical Exam  Physical Exam  Vitals and nursing note reviewed  Constitutional:       General: He is not in acute distress  Appearance: He is well-developed  HENT:      Head: Normocephalic and atraumatic  Right Ear: External ear normal       Left Ear: External ear normal    Eyes:      General: No scleral icterus  Cardiovascular:      Rate and Rhythm: Normal rate  Heart sounds: No murmur heard  Pulmonary:      Effort: Pulmonary effort is normal  No respiratory distress  Breath sounds: Normal breath sounds  No stridor  No wheezing, rhonchi or rales  Abdominal:      General: There is no distension  Musculoskeletal:         General: Normal range of motion  Cervical back: Normal range of motion  Skin:     Findings: No rash  Neurological:      Mental Status: He is alert  Mental status is at baseline  Vital Signs  ED Triage Vitals [05/26/22 1858]   Temperature Pulse Respirations Blood Pressure SpO2   98 2 °F (36 8 °C) 90 18 142/94 100 %      Temp Source Heart Rate Source Patient Position - Orthostatic VS BP Location FiO2 (%)   Temporal Monitor Sitting Left arm --      Pain Score       No Pain           Vitals:    05/26/22 1858   BP: 142/94   Pulse: 90   Patient Position - Orthostatic VS: Sitting         Visual Acuity      ED Medications  Medications - No data to display    Diagnostic Studies  Results Reviewed     None                 No orders to display              Procedures  Procedures         ED Course                               SBIRT 22yo+    Flowsheet Row Most Recent Value   SBIRT (23 yo +)    In order to provide better care to our patients, we are screening all of our patients for alcohol and drug use  Would it be okay to ask you these screening questions? Yes Filed at: 05/26/2022 1900   Initial Alcohol Screen: US AUDIT-C     1  How often do you have a drink containing alcohol? 0 Filed at: 05/26/2022 1900   2  How many drinks containing alcohol do you have on a typical day you are drinking? 0 Filed at: 05/26/2022 1900   3a  Male UNDER 65: How often do you have five or more drinks on one occasion? 0 Filed at: 05/26/2022 1900   3b  FEMALE Any Age, or MALE 65+: How often do you have 4 or more drinks on one occassion? 0 Filed at: 05/26/2022 1900   Audit-C Score 0 Filed at: 05/26/2022 1900   YESIKA: How many times in the past year have you    Used an illegal drug or used a prescription medication for non-medical reasons?  Never Filed at: 05/26/2022 1900                    MDM    Disposition  Final diagnoses:   Person with feared complaint, no diagnosis made     Time reflects when diagnosis was documented in both MDM as applicable and the Disposition within this note     Time User Action Codes Description Comment    5/26/2022  7:45 PM Rosamaria Roman Add [Z71 1] Person with feared complaint, no diagnosis made       ED Disposition     ED Disposition   Discharge    Condition   Stable    Date/Time   Thu May 26, 2022 [address] [facility] discharge to home/self care  Follow-up Information     Follow up With Specialties Details Why [address], DO Internal Medicine Go to  As needed [facility] [address] [place] [id number]  [phone]            Patient's Medications   Discharge Prescriptions    No medications on file       No discharge procedures on file      PDMP Review     None          ED Provider  Electronically Signed by           Marley Miner DO  05/30/22 2948

## 2022-08-19 ENCOUNTER — HOSPITAL ENCOUNTER (EMERGENCY)
Facility: HOSPITAL | Age: 27
Discharge: HOME | End: 2022-08-19
Attending: EMERGENCY MEDICINE | Admitting: EMERGENCY MEDICINE
Payer: COMMERCIAL

## 2022-08-19 VITALS
OXYGEN SATURATION: 97 % | RESPIRATION RATE: 18 BRPM | WEIGHT: 170 LBS | TEMPERATURE: 98 F | BODY MASS INDEX: 26.63 KG/M2 | DIASTOLIC BLOOD PRESSURE: 87 MMHG | HEART RATE: 100 BPM | SYSTOLIC BLOOD PRESSURE: 161 MMHG

## 2022-08-19 DIAGNOSIS — Z77.120 SUSPECTED EXPOSURE TO MOLD: Primary | ICD-10-CM

## 2022-08-19 PROCEDURE — 99283 EMERGENCY DEPT VISIT LOW MDM: CPT

## 2022-08-19 PROCEDURE — 99282 EMERGENCY DEPT VISIT SF MDM: CPT | Performed by: PHYSICIAN ASSISTANT

## 2022-08-19 NOTE — ED PROVIDER NOTES
History  Chief Complaint   Patient presents with    Generalized Body Aches     Pt exposed to black mold  Pt has no complaints  Patient is a 28 y/o M with h/o autism, bipolar d/o, that was brought to the ED from group home for possible black mold exposure  Staff states they saw black mold in his room  Patient has no symptoms  No trouble breathing, no cough  Patient acting normal self  History provided by:  Caregiver  History limited by:  Patient nonverbal      Prior to Admission Medications   Prescriptions Last Dose Informant Patient Reported? Taking?    Divalproex Sodium (DEPAKOTE PO)   Yes No   Sig: Take 125 mg by mouth 4 (four) times a day    FLUOXETINE HCL PO   Yes No   Sig: Take by mouth   LORazepam (ATIVAN) 1 mg tablet   Yes No   Sig: Take 1 mg by mouth every 8 (eight) hours as needed for anxiety   Loratadine (CLARITIN PO)   Yes No   Sig: Take by mouth   Starch, Thickening, POWD   Yes No   Sig: USE AS DIRECTED FOR NECTAR THICK CONSISTENCY (R13 12)   carbamide peroxide (Debrox) 6 5 % otic solution   Yes No   Sig: INSTILL 5 DROPS INTO BOTH EARS DAILY ON THE FIRST 3 DAYS OF EACH MONTH (EAR WAX)   guanFACINE HCl (TENEX PO)   Yes No   Sig: Take by mouth   ibuprofen (MOTRIN) 600 mg tablet   Yes No   Sig: Take 600 mg by mouth every 6 (six) hours as needed   lamoTRIgine (LaMICtal) 25 mg tablet   Yes No   Sig: Take 25 mg by mouth 2 (two) times a day   melatonin 3 mg   Yes No   Sig: Take 6 mg by mouth daily at bedtime   neomycin-bacitracin-polymyxin b (NEOSPORIN) ointment   No No   Sig: Apply topically 3 (three) times a day   ondansetron (ZOFRAN-ODT) 4 mg disintegrating tablet   No No   Sig: Take 1 tablet (4 mg total) by mouth every 8 (eight) hours as needed for nausea or vomiting for up to 7 days   risperiDONE (RisperDAL) 1 mg tablet   Yes No   Sig: Take 1 mg by mouth daily    risperiDONE (RisperDAL) 2 mg tablet   Yes No   Sig: Take 2 mg by mouth daily At night time   traZODone (DESYREL) 50 mg tablet Yes No   Sig: Take 50 mg by mouth daily at bedtime      Facility-Administered Medications: None       Past Medical History:   Diagnosis Date    Autism     Bipolar disorder ([facility] )     Dysphagia     Explosive personality disorder ([facility] )     Insomnia        History reviewed  No pertinent surgical history  History reviewed  No pertinent family history  I have reviewed and agree with the history as documented  E-Cigarette/Vaping    E-Cigarette Use Never User      E-Cigarette/Vaping Substances     Social History     Tobacco Use    Smoking status: Never Smoker    Smokeless tobacco: Never Used   Vaping Use    Vaping Use: Never used   Substance Use Topics    Alcohol use: Never    Drug use: Never       Review of Systems   Unable to perform ROS: Patient nonverbal   Constitutional: Negative for chills and fever  HENT: Negative  Respiratory: Negative for cough  Gastrointestinal: Negative for vomiting  Skin: Negative for color change and rash  Neurological: Negative for weakness  Psychiatric/Behavioral: Negative for confusion  Physical Exam  Physical Exam  Vitals and nursing note reviewed  Constitutional:       General: He is not in acute distress  Appearance: Normal appearance  He is well-developed and well-groomed  He is not ill-appearing  HENT:      Head: Normocephalic and atraumatic  Right Ear: External ear normal       Left Ear: External ear normal       Nose: Nose normal    Eyes:      Conjunctiva/sclera: Conjunctivae normal       Pupils: Pupils are equal    Cardiovascular:      Rate and Rhythm: Normal rate and regular rhythm  Heart sounds: Normal heart sounds  Pulmonary:      Effort: Pulmonary effort is normal       Breath sounds: Normal breath sounds  No wheezing, rhonchi or rales  Musculoskeletal:         General: Normal range of motion  Cervical back: Normal range of motion and neck supple  Skin:     General: Skin is warm and dry        Coloration: Skin is not jaundiced or pale  Findings: No rash  Neurological:      General: No focal deficit present  Mental Status: He is alert  Mental status is at baseline  Motor: No weakness  Vital Signs  ED Triage Vitals [08/19/22 1605]   Temperature Pulse Respirations Blood Pressure SpO2   98 °F (36 7 °C) 100 18 161/87 97 %      Temp src Heart Rate Source Patient Position - Orthostatic VS BP Location FiO2 (%)   -- Monitor Sitting Right arm --      Pain Score       --           Vitals:    08/19/22 1605   BP: 161/87   Pulse: 100   Patient Position - Orthostatic VS: Sitting         Visual Acuity      ED Medications  Medications - No data to display    Diagnostic Studies  Results Reviewed     None                 No orders to display              Procedures  Procedures         ED Course                                             MDM  Number of Diagnoses or Management Options  Suspected exposure to mold: minor  Diagnosis management comments: Patient exposed to black mold, no symptoms, no need for further evaluation  Patient Progress  Patient progress: stable      Disposition  Final diagnoses:   Suspected exposure to mold     Time reflects when diagnosis was documented in both MDM as applicable and the Disposition within this note     Time User Action Codes Description Comment    8/19/2022  4:10 PM Dillon Thornton Add [Z77 120] Suspected exposure to mold       ED Disposition     ED Disposition   Discharge    Condition   Stable    Date/Time   Fri Aug 19, 2022  4:10 PM    Comment   Rupesh Coins discharge to home/self care  Follow-up Information     Follow up With Specialties Details Why [address], DO Internal Medicine Call  As needed, For recheck [address] [place] [address] 8305  [phone]            Patient's Medications   Discharge Prescriptions    No medications on file       No discharge procedures on file      PDMP Review     None ED Provider  Electronically Signed by           Bethany Erickson PA-C  08/19/22 1169

## 2023-01-20 ENCOUNTER — HOSPITAL ENCOUNTER (EMERGENCY)
Facility: HOSPITAL | Age: 28
Discharge: HOME | End: 2023-01-20
Attending: EMERGENCY MEDICINE

## 2023-01-20 VITALS
BODY MASS INDEX: 26.68 KG/M2 | SYSTOLIC BLOOD PRESSURE: 148 MMHG | HEIGHT: 67 IN | RESPIRATION RATE: 18 BRPM | OXYGEN SATURATION: 100 % | WEIGHT: 170 LBS | HEART RATE: 84 BPM | TEMPERATURE: 97.6 F | DIASTOLIC BLOOD PRESSURE: 77 MMHG

## 2023-01-20 DIAGNOSIS — Z20.01: Primary | ICD-10-CM

## 2023-01-20 NOTE — DISCHARGE INSTRUCTIONS
-As discussed, please watch out for symptoms including diarrhea, blood in the stool, high fevers, nausea, vomiting or any new or worsening symptoms    -The patient develops any new symptoms please return to care immediately

## 2023-01-20 NOTE — ED PROVIDER NOTES
History  Chief Complaint   Patient presents with   • Medical Problem     Patient presents to the ED with staff, states water source was contaminated with E Coli and was told to come get checked  States no symptoms, resident at [address] [place] [address]      Patient is a 44-year-old male with a history of intellectual disability, nonverbal at baseline presenting in a group home who presents for exposure of E  coli  Apparently water was tested at his group home and it was positive for E  coli  Patient has no symptoms has been acting totally normal   No nausea vomiting or diarrhea noted  No blood in the stool noted  Patient has not been spiking any fevers and otherwise acting completely normal   He is nonverbal but does not seem to be acting abnormally  Group home wanted him to get "checked out"  Prior to Admission Medications   Prescriptions Last Dose Informant Patient Reported? Taking?    Divalproex Sodium (DEPAKOTE PO)   Yes No   Sig: Take 125 mg by mouth 4 (four) times a day    FLUOXETINE HCL PO   Yes No   Sig: Take by mouth   LORazepam (ATIVAN) 1 mg tablet   Yes No   Sig: Take 1 mg by mouth every 8 (eight) hours as needed for anxiety   Loratadine (CLARITIN PO)   Yes No   Sig: Take by mouth   Starch, Thickening, POWD   Yes No   Sig: USE AS DIRECTED FOR NECTAR THICK CONSISTENCY (R13 12)   carbamide peroxide (Debrox) 6 5 % otic solution   Yes No   Sig: INSTILL 5 DROPS INTO BOTH EARS DAILY ON THE FIRST 3 DAYS OF EACH MONTH (EAR WAX)   guanFACINE HCl (TENEX PO)   Yes No   Sig: Take by mouth   ibuprofen (MOTRIN) 600 mg tablet   Yes No   Sig: Take 600 mg by mouth every 6 (six) hours as needed   lamoTRIgine (LaMICtal) 25 mg tablet   Yes No   Sig: Take 25 mg by mouth 2 (two) times a day   melatonin 3 mg   Yes No   Sig: Take 6 mg by mouth daily at bedtime   neomycin-bacitracin-polymyxin b (NEOSPORIN) ointment   No No   Sig: Apply topically 3 (three) times a day   ondansetron (ZOFRAN-ODT) 4 mg disintegrating tablet   No No   Sig: Take 1 tablet (4 mg total) by mouth every 8 (eight) hours as needed for nausea or vomiting for up to 7 days   risperiDONE (RisperDAL) 1 mg tablet   Yes No   Sig: Take 1 mg by mouth daily    risperiDONE (RisperDAL) 2 mg tablet   Yes No   Sig: Take 2 mg by mouth daily At night time   traZODone (DESYREL) 50 mg tablet   Yes No   Sig: Take 50 mg by mouth daily at bedtime      Facility-Administered Medications: None       Past Medical History:   Diagnosis Date   • Autism    • Bipolar disorder ([facility] )    • Dysphagia    • Explosive personality disorder ([facility] )    • Insomnia        History reviewed  No pertinent surgical history  History reviewed  No pertinent family history  I have reviewed and agree with the history as documented  E-Cigarette/Vaping   • E-Cigarette Use Never User      E-Cigarette/Vaping Substances     Social History     Tobacco Use   • Smoking status: Never   • Smokeless tobacco: Never   Vaping Use   • Vaping Use: Never used   Substance Use Topics   • Alcohol use: Never   • Drug use: Never       Review of Systems   Unable to perform ROS: Patient nonverbal   All other systems reviewed and are negative  Physical Exam  Physical Exam  Vitals reviewed  Constitutional:       General: He is not in acute distress  Appearance: He is well-developed  HENT:      Head: Normocephalic  Eyes:      Pupils: Pupils are equal, round, and reactive to light  Cardiovascular:      Rate and Rhythm: Normal rate and regular rhythm  Heart sounds: Normal heart sounds  No murmur heard  No friction rub  No gallop  Pulmonary:      Effort: Pulmonary effort is normal       Breath sounds: Normal breath sounds  Abdominal:      General: Bowel sounds are normal  There is no distension  Palpations: Abdomen is soft  Tenderness: There is no abdominal tenderness  There is no guarding  Comments: No abdominal tenderness   Musculoskeletal:         General: Normal range of motion        Cervical back: Normal range of motion and neck supple  Skin:     Capillary Refill: Capillary refill takes less than 2 seconds  Neurological:      General: No focal deficit present  Mental Status: He is alert  Motor: No abnormal muscle tone  Psychiatric:      Comments: Nonverbal         Vital Signs  ED Triage Vitals   Temperature Pulse Respirations Blood Pressure SpO2   01/20/23 1550 01/20/23 1548 01/20/23 1548 01/20/23 1548 01/20/23 1548   97 6 °F (36 4 °C) 84 18 148/77 100 %      Temp Source Heart Rate Source Patient Position - Orthostatic VS BP Location FiO2 (%)   01/20/23 1550 01/20/23 1548 -- -- --   Temporal Monitor         Pain Score       01/20/23 1548       No Pain           Vitals:    01/20/23 1548   BP: 148/77   Pulse: 84         Visual Acuity      ED Medications  Medications - No data to display    Diagnostic Studies  Results Reviewed     None                 No orders to display              Procedures  Procedures         ED Course                                             Medical Decision Making  Patient is a 82-year-old male presents for evaluation of E  coli exposure  Completely asymptomatic at this time  Because of his lack of symptoms, I do not feel that a work-up is warranted at this point  I advised the group home employee who is his caregiver that if he starts developing any symptoms including nausea vomiting hematochezia diarrhea fevers he should be immediately evaluated in the emergency department  However because he is asymptomatic I do not think that that a work-up is warranted at this time      Exposure to E  coli: acute illness or injury      Disposition  Final diagnoses:   Exposure to E  coli     Time reflects when diagnosis was documented in both MDM as applicable and the Disposition within this note     Time User Action Codes Description Comment    1/20/2023  4:11 PM Camila Cortez Add [Z20 01] Exposure to E  coli       ED Disposition     ED Disposition   Discharge    Condition Stable    Date/Time   Fri Jan 20, 2023  4:11 PM    Comment   Karsten Hamilton discharge to home/self care  Follow-up Information     Follow up With Specialties Details Why Contact Info Additional Information     [address] Emergency Department Emergency Medicine  If symptoms worsen [address] [place],[address] [address]  [address] [facility] Emergency Department, [address], [place], [address]eline [address]    Jose Vallejo, DO Internal Medicine Schedule an appointment as soon as possible for a visit in 2 days For symptom recheck [address]  [address] [id number] [phone]             Patient's Medications   Discharge Prescriptions    No medications on file       No discharge procedures on file      PDMP Review     None          ED Provider  Electronically Signed by           Tobias Yanez MD  01/20/23 8401

## 2023-12-03 ENCOUNTER — APPOINTMENT (OUTPATIENT)
Dept: RADIOLOGY | Facility: HOSPITAL | Age: 28
End: 2023-12-03
Payer: COMMERCIAL

## 2023-12-03 ENCOUNTER — HOSPITAL ENCOUNTER (EMERGENCY)
Facility: HOSPITAL | Age: 28
Discharge: HOME | End: 2023-12-03
Attending: EMERGENCY MEDICINE
Payer: COMMERCIAL

## 2023-12-03 VITALS
TEMPERATURE: 98.4 F | OXYGEN SATURATION: 96 % | DIASTOLIC BLOOD PRESSURE: 90 MMHG | SYSTOLIC BLOOD PRESSURE: 143 MMHG | RESPIRATION RATE: 18 BRPM | HEART RATE: 89 BPM

## 2023-12-03 DIAGNOSIS — M79.672 FOOT PAIN, LEFT: Primary | ICD-10-CM

## 2023-12-03 PROCEDURE — 73630 X-RAY EXAM OF FOOT: CPT

## 2023-12-03 PROCEDURE — 99284 EMERGENCY DEPT VISIT MOD MDM: CPT | Performed by: EMERGENCY MEDICINE

## 2023-12-03 PROCEDURE — 99283 EMERGENCY DEPT VISIT LOW MDM: CPT

## 2023-12-03 NOTE — ED PROVIDER NOTES
History  Chief Complaint   Patient presents with    Foot Pain     Per staff pt has a red natasha on his left foot that seems to be irritating patient. Pt is non verbal.     24-year-old male present with caregiver for evaluation of a red spot on the heel of his left foot. There is no reported injury. They feel like the patient seems to be bothered by this. The patient is nonverbal.      History provided by:  Caregiver  History limited by:  Patient nonverbal      Prior to Admission Medications   Prescriptions Last Dose Informant Patient Reported? Taking?    Divalproex Sodium (DEPAKOTE PO)   Yes No   Sig: Take 125 mg by mouth 4 (four) times a day    FLUOXETINE HCL PO   Yes No   Sig: Take by mouth   LORazepam (ATIVAN) 1 mg tablet   Yes No   Sig: Take 1 mg by mouth every 8 (eight) hours as needed for anxiety   Loratadine (CLARITIN PO)   Yes No   Sig: Take by mouth   Starch, Thickening, POWD   Yes No   Sig: USE AS DIRECTED FOR NECTAR THICK CONSISTENCY (R13.12)   carbamide peroxide (Debrox) 6.5 % otic solution   Yes No   Sig: INSTILL 5 DROPS INTO BOTH EARS DAILY ON THE FIRST 3 DAYS OF EACH MONTH (EAR WAX)   guanFACINE HCl (TENEX PO)   Yes No   Sig: Take by mouth   ibuprofen (MOTRIN) 600 mg tablet   Yes No   Sig: Take 600 mg by mouth every 6 (six) hours as needed   lamoTRIgine (LaMICtal) 25 mg tablet   Yes No   Sig: Take 25 mg by mouth 2 (two) times a day   melatonin 3 mg   Yes No   Sig: Take 6 mg by mouth daily at bedtime   neomycin-bacitracin-polymyxin b (NEOSPORIN) ointment   No No   Sig: Apply topically 3 (three) times a day   ondansetron (ZOFRAN-ODT) 4 mg disintegrating tablet   No No   Sig: Take 1 tablet (4 mg total) by mouth every 8 (eight) hours as needed for nausea or vomiting for up to 7 days   risperiDONE (RisperDAL) 1 mg tablet   Yes No   Sig: Take 1 mg by mouth daily    risperiDONE (RisperDAL) 2 mg tablet   Yes No   Sig: Take 2 mg by mouth daily At night time   traZODone (DESYREL) 50 mg tablet   Yes No   Sig: Take 50 mg by mouth daily at bedtime      Facility-Administered Medications: None       Past Medical History:   Diagnosis Date    Autism     Bipolar disorder ([address])     Dysphagia     Explosive personality disorder ([address])     Insomnia        History reviewed. No pertinent surgical history. History reviewed. No pertinent family history. I have reviewed and agree with the history as documented. E-Cigarette/Vaping    E-Cigarette Use Never User      E-Cigarette/Vaping Substances     Social History     Tobacco Use    Smoking status: Never    Smokeless tobacco: Never   Vaping Use    Vaping Use: Never used   Substance Use Topics    Alcohol use: Never    Drug use: Never       Review of Systems    Physical Exam  Physical Exam  Vitals reviewed. Constitutional:       General: He is not in acute distress. Appearance: He is not ill-appearing. Musculoskeletal:        Feet:    Neurological:      Mental Status: He is alert. Mental status is at baseline. Vital Signs  ED Triage Vitals [12/03/23 0923]   Temperature Pulse Respirations Blood Pressure SpO2   98.4 °F (36.9 °C) 89 18 143/90 96 %      Temp Source Heart Rate Source Patient Position - Orthostatic VS BP Location FiO2 (%)   Temporal Monitor -- -- --      Pain Score       --           Vitals:    12/03/23 0923   BP: 143/90   Pulse: 89         Visual Acuity      ED Medications  Medications - No data to display    Diagnostic Studies  Results Reviewed       None                   XR foot 3+ views LEFT   ED Interpretation by Jane Cunningham DO (12/03 6278)   No acute osseous abnormality, no foreign body noted.   Oblique view limited by motion artifact                 Procedures  Procedures         ED Course                                             Medical Decision Making  Differential diagnosis: Pressure injury from jumping, puncture wound that is healing, occult retained foreign body, early plantars wart  PLan  to obtain x-ray to rule out occult fracture or foreign body    Discussed plan for discharge and outpatient podiatry follow-up with caregiver as well as the supervisor on the phone. Amount and/or Complexity of Data Reviewed  Radiology: ordered and independent interpretation performed. Disposition  Final diagnoses: Foot pain, left     Time reflects when diagnosis was documented in both MDM as applicable and the Disposition within this note       Time User Action Codes Description Comment    12/3/2023  9:43 AM Liv Garcia [I85.277] Foot pain, left           ED Disposition       ED Disposition   Discharge    Condition   Stable    Date/Time   Sun Dec 3, 2023  9:43 AM    Comment   Ozzie Barroso discharge to home/self care.                    Follow-up Information       Follow up With Specialties Details Why Contact Info Additional Information    [facility] Podiatry [facility] Schedule an appointment as soon as possible for a visit  For further evaluation, if not improved [facility] [address] [address]  [address] [place] [address], [address], [place], [facility] [address]            Discharge Medication List as of 12/3/2023  9:43 AM        CONTINUE these medications which have NOT CHANGED    Details   carbamide peroxide (Debrox) 6.5 % otic solution INSTILL 5 DROPS INTO BOTH EARS DAILY ON THE FIRST 3 DAYS OF EACH MONTH (EAR WAX), Historical Med      Divalproex Sodium (DEPAKOTE PO) Take 125 mg by mouth 4 (four) times a day , Historical Med      FLUOXETINE HCL PO Take by mouth, Historical Med      guanFACINE HCl (TENEX PO) Take by mouth, Historical Med      ibuprofen (MOTRIN) 600 mg tablet Take 600 mg by mouth every 6 (six) hours as needed, Starting Thu 3/5/2020, Until Fri 3/5/2021, Historical Med      lamoTRIgine (LaMICtal) 25 mg tablet Take 25 mg by mouth 2 (two) times a day, Historical Med      Loratadine (CLARITIN PO) Take by mouth, Historical Med      LORazepam (ATIVAN) 1 mg tablet Take 1 mg by mouth every 8 (eight) hours as needed for anxiety, Historical Med      melatonin 3 mg Take 6 mg by mouth daily at bedtime, Historical Med      neomycin-bacitracin-polymyxin b (NEOSPORIN) ointment Apply topically 3 (three) times a day, Starting Fri 9/10/2021, Normal      ondansetron (ZOFRAN-ODT) 4 mg disintegrating tablet Take 1 tablet (4 mg total) by mouth every 8 (eight) hours as needed for nausea or vomiting for up to 7 days, Starting Sat 12/12/2020, Until Sat 12/19/2020, Normal      !! risperiDONE (RisperDAL) 1 mg tablet Take 1 mg by mouth daily , Historical Med      !! risperiDONE (RisperDAL) 2 mg tablet Take 2 mg by mouth daily At night time, Historical Med      Starch, Thickening, POWD USE AS DIRECTED FOR NECTAR THICK CONSISTENCY (R13.12), Historical Med      traZODone (DESYREL) 50 mg tablet Take 50 mg by mouth daily at bedtime, Historical Med       !! - Potential duplicate medications found. Please discuss with provider. No discharge procedures on file.     PDMP Review       None            ED Provider  Electronically Signed by             New Decker DO  12/03/23 1016

## 2024-10-31 ENCOUNTER — HOSPITAL ENCOUNTER (EMERGENCY)
Facility: HOSPITAL | Age: 29
Discharge: HOME | End: 2024-10-31
Attending: EMERGENCY MEDICINE
Payer: COMMERCIAL

## 2024-10-31 VITALS
TEMPERATURE: 97.6 F | SYSTOLIC BLOOD PRESSURE: 182 MMHG | OXYGEN SATURATION: 97 % | DIASTOLIC BLOOD PRESSURE: 83 MMHG | RESPIRATION RATE: 18 BRPM | HEART RATE: 87 BPM
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
## 2024-10-31 DIAGNOSIS — Z13.9 ENCOUNTER FOR MEDICAL SCREENING EXAMINATION: ICD-10-CM

## 2024-10-31 DIAGNOSIS — Z00.00 HEALTHY ADULT ON ROUTINE PHYSICAL EXAMINATION: ICD-10-CM

## 2024-10-31 PROCEDURE — 99283 EMERGENCY DEPT VISIT LOW MDM: CPT

## 2024-10-31 PROCEDURE — 99284 EMERGENCY DEPT VISIT MOD MDM: CPT

## 2024-10-31 NOTE — DISCHARGE INSTRUCTIONS
Ronnell appears well and his exam.  His vital signs are with slightly elevated blood pressure, this is most likely related to his movement and nervousness.  His heart and lung sounds are clear.  His abdominal exam is benign.  He has no bony tenderness or joint swelling or wound throughout his body.  He does have a healing abrasion to his right chest which is likely from scratching and not acutely new or related to today's accident.  Please return if he develops complaint of pain, difficulty breathing, vomiting greater than 2 times, blood in vomit or urine or stool, weakness, confusion, or lethargy.
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
## 2024-12-04 ENCOUNTER — OFFICE VISIT (OUTPATIENT)
Dept: PODIATRY | Facility: CLINIC | Age: 29
End: 2024-12-04
Payer: COMMERCIAL

## 2024-12-04 DIAGNOSIS — F84.0 AUTISM: ICD-10-CM

## 2024-12-04 DIAGNOSIS — M79.675 PAIN IN TOES OF BOTH FEET: ICD-10-CM

## 2024-12-04 DIAGNOSIS — M79.674 PAIN IN TOES OF BOTH FEET: ICD-10-CM

## 2024-12-04 DIAGNOSIS — B35.1 ONYCHOMYCOSIS: Primary | ICD-10-CM

## 2024-12-04 PROCEDURE — 11720 DEBRIDE NAIL 1-5: CPT | Performed by: PODIATRIST

## 2024-12-04 PROCEDURE — RECHECK: Performed by: PODIATRIST

## 2024-12-04 RX ORDER — MUPIROCIN 20 MG/G
OINTMENT TOPICAL
COMMUNITY
Start: 2024-10-01

## 2024-12-04 RX ORDER — MUPIROCIN CALCIUM 20 MG/G
CREAM TOPICAL 2 TIMES DAILY
COMMUNITY
Start: 2024-11-19

## 2024-12-04 RX ORDER — MOMETASONE FUROATE 1 MG/G
OINTMENT TOPICAL DAILY
COMMUNITY
Start: 2024-11-19

## 2024-12-04 NOTE — PROGRESS NOTES
Name: Ronnell Sweet      : 1995      MRN: [id number]  Encounter Provider: Russ Shabazz DPM  Encounter Date: 2024   Encounter department: [facility] [place]  :  Assessment & Plan  Onychomycosis       Debride mycotic nails and thin the nail plates x 4 with the use of a nail nipper manually and an electric Dremel bur was used to reduce the thickness of the nail beds and smoothed the distal aspect of the nails.   Pain in toes of both feet         Autism         Explained to the staff that while this visit maybe covered as it is at this office, subsequent visits are not covered. by Medicare.  Treatment today consisted of nail trimming.      Return in about 10 weeks (around 2025).     History of Present Illness     HPI  Ronnell Sweet is a 29 y.o. male who presents with chief complaint of painful thick nails on both feet according to his staff.  His staff was present in the room for today's visit.  History obtained from: patient    Review of Systems  Medical History Reviewed by provider this encounter:     .  Current Outpatient Medications on File Prior to Visit   Medication Sig Dispense Refill    carbamide peroxide (Debrox) 6.5 % otic solution INSTILL 5 DROPS INTO BOTH EARS DAILY ON THE FIRST 3 DAYS OF EACH MONTH (EAR WAX)      Divalproex Sodium (DEPAKOTE PO) Take 125 mg by mouth 4 (four) times a day       guanFACINE HCl (TENEX PO) Take by mouth      Loratadine (CLARITIN PO) Take by mouth      LORazepam (ATIVAN) 1 mg tablet Take 1 mg by mouth every 8 (eight) hours as needed for anxiety      melatonin 3 mg Take 6 mg by mouth daily at bedtime      mometasone (ELOCON) 0.1 % ointment Apply topically daily      mupirocin (BACTROBAN) 2 % cream Apply topically 2 (two) times a day      mupirocin (BACTROBAN) 2 % ointment       neomycin-bacitracin-polymyxin b (NEOSPORIN) ointment Apply topically 3 (three) times a day 15 g 0    Starch, Thickening, POWD USE AS DIRECTED FOR NECTAR THICK CONSISTENCY  (R13.12)      FLUOXETINE HCL PO Take by mouth      ibuprofen (MOTRIN) 600 mg tablet Take 600 mg by mouth every 6 (six) hours as needed      lamoTRIgine (LaMICtal) 25 mg tablet Take 25 mg by mouth 2 (two) times a day      ondansetron (ZOFRAN-ODT) 4 mg disintegrating tablet Take 1 tablet (4 mg total) by mouth every 8 (eight) hours as needed for nausea or vomiting for up to 7 days 10 tablet 0    risperiDONE (RisperDAL) 1 mg tablet Take 1 mg by mouth daily       risperiDONE (RisperDAL) 2 mg tablet Take 2 mg by mouth daily At night time      traZODone (DESYREL) 50 mg tablet Take 50 mg by mouth daily at bedtime       No current facility-administered medications on file prior to visit.         Objective   There were no vitals taken for this visit.     Physical Exam  Vascular status is 2/4 DP PT sparse digital hair normal distal cooling immediate capillary refill bilaterally.  Capillary refill is approximately 2 seconds.    Derm nails are brittle elongated hypertrophic yellow discoloration with subungual debris x 4.  There is an increased thickness and the nails are approximately 1 to 2 mm.    Ortho mild hammertoe deformities are present on the fifth digits bilaterally.    Neuro light touch is intact and equal bilaterally.    Administrative Statements   I have spent a total time of 15 minutes in caring for this patient on the day of the visit/encounter including Risks and benefits of tx options, Instructions for management, Patient and family education, Importance of tx compliance, Counseling / Coordination of care, and Documenting in the medical record.

## 2025-02-12 ENCOUNTER — OFFICE VISIT (OUTPATIENT)
Dept: PODIATRY | Facility: CLINIC | Age: 30
End: 2025-02-12

## 2025-02-12 VITALS — WEIGHT: 180 LBS | HEIGHT: 67 IN | BODY MASS INDEX: 28.25 KG/M2

## 2025-02-12 DIAGNOSIS — B35.1 ONYCHOMYCOSIS: Primary | ICD-10-CM

## 2025-02-12 DIAGNOSIS — F84.0 AUTISM: ICD-10-CM

## 2025-02-12 DIAGNOSIS — M79.674 PAIN IN TOES OF BOTH FEET: ICD-10-CM

## 2025-02-12 DIAGNOSIS — M79.675 PAIN IN TOES OF BOTH FEET: ICD-10-CM

## 2025-02-12 PROCEDURE — RECHECK: Performed by: PODIATRIST

## 2025-02-12 NOTE — PROGRESS NOTES
Name: Ronnell Sweet      : 1995      MRN: [id number]  Encounter Provider: Russ Shabazz DPM  Encounter Date: 2025   Encounter department: [facility] [place]  :  Assessment & Plan  Onychomycosis       Debride mycotic nails and thin the nail plates x 2 with the use of a nail nipper manually and an electric Dremel bur was used to reduce the thickness of the nail beds and smoothed the distal aspect of the nails.   Pain in toes of both feet         Autism         Debride Tyloma to dermal layer x 2 with the use of a 312 blade and sharp dissection.     Return in about 10 weeks (around 2025).     History of Present Illness   HPI  Ronnell Sweet is a 29 y.o. male who presents with chief complaint of painful thick nails on both feet according to his support staff.  Patient has autism and is in a group home setting.  Patient is unable to do his own nails and by state regulations staff are unable to do toenails.  History obtained from: patient's Legal Guardian    Review of Systems  Medical History Reviewed by provider this encounter:     .  Current Outpatient Medications on File Prior to Visit   Medication Sig Dispense Refill    carbamide peroxide (Debrox) 6.5 % otic solution INSTILL 5 DROPS INTO BOTH EARS DAILY ON THE FIRST 3 DAYS OF EACH MONTH (EAR WAX)      Divalproex Sodium (DEPAKOTE PO) Take 125 mg by mouth 4 (four) times a day       FLUOXETINE HCL PO Take by mouth      guanFACINE HCl (TENEX PO) Take by mouth      lamoTRIgine (LaMICtal) 25 mg tablet Take 25 mg by mouth 2 (two) times a day      Loratadine (CLARITIN PO) Take by mouth      LORazepam (ATIVAN) 1 mg tablet Take 1 mg by mouth every 8 (eight) hours as needed for anxiety      melatonin 3 mg Take 6 mg by mouth daily at bedtime      mometasone (ELOCON) 0.1 % ointment Apply topically daily      mupirocin (BACTROBAN) 2 % cream Apply topically 2 (two) times a day      mupirocin (BACTROBAN) 2 % ointment        "neomycin-bacitracin-polymyxin b (NEOSPORIN) ointment Apply topically 3 (three) times a day 15 g 0    risperiDONE (RisperDAL) 1 mg tablet Take 1 mg by mouth daily       risperiDONE (RisperDAL) 2 mg tablet Take 2 mg by mouth daily At night time      Starch, Thickening, POWD USE AS DIRECTED FOR NECTAR THICK CONSISTENCY (R13.12)      traZODone (DESYREL) 50 mg tablet Take 50 mg by mouth daily at bedtime      ibuprofen (MOTRIN) 600 mg tablet Take 600 mg by mouth every 6 (six) hours as needed      ondansetron (ZOFRAN-ODT) 4 mg disintegrating tablet Take 1 tablet (4 mg total) by mouth every 8 (eight) hours as needed for nausea or vomiting for up to 7 days 10 tablet 0     No current facility-administered medications on file prior to visit.      Social History     Tobacco Use    Smoking status: Never    Smokeless tobacco: Never   Vaping Use    Vaping status: Never Used   Substance and Sexual Activity    Alcohol use: Never    Drug use: Never    Sexual activity: Not on file        Objective   Ht 5' 7\" (1.702 m)   Wt 81.6 kg (180 lb)   BMI 28.19 kg/m²      Physical Exam  Vascular status is 1/4 DP PT negative digital hair normal distal cooling immediate capillary refill bilaterally.  Capillary refill is approximately 2 seconds.    Derm nails are brittle elongated hypertrophic white discoloration with subungual debris x 2.  There is an increased thickness and the nails are approximately 1 to 2 mm.  There is hypertrophic tissue present on the plantar medial aspect of the no signs of any dried blood or trophic changes present.    Administrative Statements   I have spent a total time of 15 minutes in caring for this patient on the day of the visit/encounter including Risks and benefits of tx options, Instructions for management, Patient and family education, Importance of tx compliance, Counseling / Coordination of care, Documenting in the medical record, and Obtaining or reviewing history  .   "

## 2025-05-14 ENCOUNTER — PROCEDURE VISIT (OUTPATIENT)
Dept: PODIATRY | Facility: CLINIC | Age: 30
End: 2025-05-14
Payer: COMMERCIAL

## 2025-05-14 VITALS — WEIGHT: 184 LBS | HEIGHT: 67 IN | BODY MASS INDEX: 28.88 KG/M2

## 2025-05-14 DIAGNOSIS — L60.3 DYSTROPHIC NAIL: Primary | ICD-10-CM

## 2025-05-14 DIAGNOSIS — F84.0 AUTISM: ICD-10-CM

## 2025-05-14 DIAGNOSIS — S90.32XA CONTUSION OF LEFT FOOT, INITIAL ENCOUNTER: ICD-10-CM

## 2025-05-14 DIAGNOSIS — M79.674 PAIN IN TOES OF BOTH FEET: ICD-10-CM

## 2025-05-14 DIAGNOSIS — M79.675 PAIN IN TOES OF BOTH FEET: ICD-10-CM

## 2025-05-14 PROCEDURE — 99212 OFFICE O/P EST SF 10 MIN: CPT | Performed by: PODIATRIST

## 2025-05-14 RX ORDER — LORATADINE 10 MG/1
TABLET ORAL
COMMUNITY
Start: 2025-04-10

## 2025-05-14 RX ORDER — LANOLIN ALCOHOLS 100 %
1 WAX (GRAM) MISCELLANEOUS DAILY
COMMUNITY
Start: 2024-11-19

## 2025-05-14 NOTE — PROGRESS NOTES
"Name: Ronnell Sweet      : 1995      MRN: [id number]  Encounter Provider: Russ Shabazz DPM  Encounter Date: 2025   Encounter department: [facility] [place]  :  Assessment & Plan  Dystrophic nail       Trim dystrophic nails x 10  Autism         Pain in toes of both feet         Contusion of left foot, initial encounter       Applied triple antibiotic to the area.  Was unable to apply a Band-Aid because the patient's autism he would quickly remove the Band-Aid.  Explained to aide that while this visit should be covered, subsequent visits are not covered. by Medicare.  Treatment today consisted of nail trimming.  It was explained to the staff that he does not fit into the insurance criteria for routine footcare and it will be an out-of-pocket expense.    Return in about 3 months (around 2025).     History of Present Illness   HPI  Ronnell Sweet is a 29 y.o. male who presents with chief complaint of elongated nails on both feet and a sore on the top of his left foot.  He was seen in the room with his support staff present.  History obtained from: patient's Legal Guardian and patient's caregiver    Review of Systems  Medical History Reviewed by provider this encounter:     .  Medications Ordered Prior to Encounter[1]   Social History     Tobacco Use    Smoking status: Never    Smokeless tobacco: Never   Vaping Use    Vaping status: Never Used   Substance and Sexual Activity    Alcohol use: Never    Drug use: Never    Sexual activity: Not on file        Objective   Ht 5' 7\" (1.702 m) Comment: verbal  Wt 83.5 kg (184 lb)   BMI 28.82 kg/m²      Physical Exam  Vascular status is 1/4 DP PT negative digital hair, normal distal cooling, immediate capillary refill bilaterally.  Capillary refill is approximately 2 seconds.     Derm nails are brittle elongated yellow discoloration with no hypertrophic debris present x 10.  There is a circular lesion present on the dorsal aspect of the left " foot in the fourth interspace.  The lesion measures approximately 3 mm x 3 mm it has a granular base and eschar present no signs of any infection no depth is noted.  The lesion was show to the staff so they can noted also.       [1]   Current Outpatient Medications on File Prior to Visit   Medication Sig Dispense Refill    carbamide peroxide (Debrox) 6.5 % otic solution       Divalproex Sodium (DEPAKOTE PO) Take 125 mg by mouth in the morning and 125 mg at noon and 125 mg in the evening and 125 mg before bedtime.      FLUOXETINE HCL PO Take by mouth      guanFACINE HCl (TENEX PO) Take by mouth      lamoTRIgine (LaMICtal) 25 mg tablet Take 25 mg by mouth in the morning and 25 mg in the evening.      Lanolin Alcohol WAX Apply 1 application. topically daily      Loratadine (CLARITIN PO) Take by mouth      loratadine (CLARITIN) 10 mg tablet       LORazepam (ATIVAN) 1 mg tablet Take 1 mg by mouth every 8 (eight) hours as needed for anxiety      melatonin 3 mg Take 6 mg by mouth daily at bedtime      mometasone (ELOCON) 0.1 % ointment Apply topically in the morning.      Multiple Vitamin (MULTIVITAMIN ADULT PO) Take 1 tablet by mouth daily      mupirocin (BACTROBAN) 2 % cream Apply topically in the morning and in the evening.      mupirocin (BACTROBAN) 2 % ointment       neomycin-bacitracin-polymyxin b (NEOSPORIN) ointment Apply topically 3 (three) times a day 15 g 0    NON FORMULARY Apply topically 2 (two) times a day HYDROCORTISONE (BULK) IN WHITE PETROLATUM-MINERAL OIL      NON FORMULARY Apply topically 2 (two) times a day as needed NEOMYCIN-BACITRACIN-POLYMYXIN OINTMENT      Oxybenzone POWD Apply 1 Application topically 2 (two) times a day as needed      risperiDONE (RisperDAL) 1 mg tablet Take 1 mg by mouth in the morning.      risperiDONE (RisperDAL) 2 mg tablet Take 2 mg by mouth in the morning. At night time.      Starch, Thickening, POWD       traZODone (DESYREL) 50 mg tablet Take 50 mg by mouth daily at bedtime       ibuprofen (MOTRIN) 600 mg tablet Take 600 mg by mouth every 6 (six) hours as needed      ondansetron (ZOFRAN-ODT) 4 mg disintegrating tablet Take 1 tablet (4 mg total) by mouth every 8 (eight) hours as needed for nausea or vomiting for up to 7 days 10 tablet 0     No current facility-administered medications on file prior to visit.      Term [place]  Delivery (>/= 37 weeks)